# Patient Record
Sex: MALE | Race: WHITE | URBAN - METROPOLITAN AREA
[De-identification: names, ages, dates, MRNs, and addresses within clinical notes are randomized per-mention and may not be internally consistent; named-entity substitution may affect disease eponyms.]

---

## 2017-04-01 DIAGNOSIS — Z13.6 CARDIOVASCULAR SCREENING; LDL GOAL LESS THAN 130: ICD-10-CM

## 2017-04-01 DIAGNOSIS — Z13.1 SCREENING FOR DIABETES MELLITUS: Primary | ICD-10-CM

## 2017-04-01 NOTE — LETTER
[address] [address]  [phone]        April 11, 2018    Chevy Villarreal  [address] [facility] [address]              Dear Chevy Villarreal    This is to remind you that your fasting labs is due.    You may call our office at [phone] to schedule an appointment.    Please disregard this notice if you have already had your labs drawn or made an appointment.        Sincerely,        Albin Joshi MD

## 2017-04-01 NOTE — LETTER
"[facility][1]  [address] [address]  [phone]      4/1/2017       Chevy Villarreal  [address] [facility] [address]        Dear Nadeem,  Review of your chart shows you will be due for follow-up laboratory monitoring studies in May  for management of your history of mild cholesterol elevation.   Previous studies were not to the level of requiring medication management.  Call our appointment desk at [phone] to schedule a  fasting  \"lab only\"  appointment  at that time.  For fasting labs, please refrain from eating for 8 hours or more. You may drink water and take medications.      Sincerely,      Albin Joshi MD  Internal Medicine      "

## 2018-05-16 ENCOUNTER — TELEPHONE (OUTPATIENT)
Dept: LAB | Facility: CLINIC | Age: 38
End: 2018-05-16

## 2018-05-16 NOTE — TELEPHONE ENCOUNTER
Patient overdue for fasting labs to follow-up on history of elevated cholesterol and blood sugar values.  Patient to schedule a fasting lab appointment in the next month or so and then see me a few days later to review results and address other medical issues.  Call patient

## 2018-05-18 NOTE — TELEPHONE ENCOUNTER
Called Patient and left a message to call us back.     Gloria.HAYES Leblanc ([facility])

## 2019-10-01 ENCOUNTER — HEALTH MAINTENANCE LETTER (OUTPATIENT)
Age: 39
End: 2019-10-01

## 2021-01-15 ENCOUNTER — HEALTH MAINTENANCE LETTER (OUTPATIENT)
Age: 41
End: 2021-01-15

## 2021-09-04 ENCOUNTER — HEALTH MAINTENANCE LETTER (OUTPATIENT)
Age: 41
End: 2021-09-04

## 2022-02-19 ENCOUNTER — HEALTH MAINTENANCE LETTER (OUTPATIENT)
Age: 42
End: 2022-02-19

## 2022-10-16 ENCOUNTER — HEALTH MAINTENANCE LETTER (OUTPATIENT)
Age: 42
End: 2022-10-16

## 2023-03-26 ENCOUNTER — HEALTH MAINTENANCE LETTER (OUTPATIENT)
Age: 43
End: 2023-03-26